# Patient Record
Sex: MALE | Race: WHITE | Employment: FULL TIME | URBAN - METROPOLITAN AREA
[De-identification: names, ages, dates, MRNs, and addresses within clinical notes are randomized per-mention and may not be internally consistent; named-entity substitution may affect disease eponyms.]

---

## 2018-12-28 ENCOUNTER — TRANSFERRED RECORDS (OUTPATIENT)
Dept: HEALTH INFORMATION MANAGEMENT | Facility: CLINIC | Age: 68
End: 2018-12-28

## 2019-01-08 ENCOUNTER — HOSPITAL ENCOUNTER (EMERGENCY)
Facility: CLINIC | Age: 69
Discharge: ANOTHER HOSPITAL | End: 2019-01-09
Attending: EMERGENCY MEDICINE | Admitting: EMERGENCY MEDICINE
Payer: MEDICAID

## 2019-01-08 DIAGNOSIS — R04.2 HEMOPTYSIS: ICD-10-CM

## 2019-01-08 PROCEDURE — 96361 HYDRATE IV INFUSION ADD-ON: CPT

## 2019-01-08 PROCEDURE — 51702 INSERT TEMP BLADDER CATH: CPT

## 2019-01-08 PROCEDURE — 96375 TX/PRO/DX INJ NEW DRUG ADDON: CPT

## 2019-01-08 PROCEDURE — 93005 ELECTROCARDIOGRAM TRACING: CPT | Mod: 59

## 2019-01-08 PROCEDURE — 93308 TTE F-UP OR LMTD: CPT

## 2019-01-08 PROCEDURE — 99285 EMERGENCY DEPT VISIT HI MDM: CPT | Mod: 25

## 2019-01-08 PROCEDURE — 31500 INSERT EMERGENCY AIRWAY: CPT

## 2019-01-08 PROCEDURE — 96365 THER/PROPH/DIAG IV INF INIT: CPT

## 2019-01-09 ENCOUNTER — APPOINTMENT (OUTPATIENT)
Dept: GENERAL RADIOLOGY | Facility: CLINIC | Age: 69
End: 2019-01-09
Payer: MEDICAID

## 2019-01-09 VITALS
DIASTOLIC BLOOD PRESSURE: 98 MMHG | OXYGEN SATURATION: 96 % | TEMPERATURE: 97.1 F | HEART RATE: 128 BPM | WEIGHT: 165 LBS | RESPIRATION RATE: 21 BRPM | SYSTOLIC BLOOD PRESSURE: 143 MMHG

## 2019-01-09 LAB
ABO + RH BLD: NORMAL
ABO + RH BLD: NORMAL
ALBUMIN SERPL-MCNC: 4.1 G/DL (ref 3.4–5)
ALP SERPL-CCNC: 93 U/L (ref 40–150)
ALT SERPL W P-5'-P-CCNC: 41 U/L (ref 0–70)
ANION GAP SERPL CALCULATED.3IONS-SCNC: 11 MMOL/L (ref 6–17)
ANION GAP SERPL CALCULATED.3IONS-SCNC: 7 MMOL/L (ref 3–14)
AST SERPL W P-5'-P-CCNC: 28 U/L (ref 0–45)
BASOPHILS # BLD AUTO: 0 10E9/L (ref 0–0.2)
BASOPHILS NFR BLD AUTO: 0 %
BILIRUB SERPL-MCNC: 0.4 MG/DL (ref 0.2–1.3)
BLD GP AB SCN SERPL QL: NORMAL
BLOOD BANK CMNT PATIENT-IMP: NORMAL
BUN SERPL-MCNC: 17 MG/DL (ref 7–30)
BUN SERPL-MCNC: 17 MG/DL (ref 7–30)
CA-I BLD-SCNC: 4 MG/DL (ref 4.4–5.2)
CALCIUM SERPL-MCNC: 8.8 MG/DL (ref 8.5–10.1)
CHLORIDE BLD-SCNC: 105 MMOL/L (ref 94–109)
CHLORIDE SERPL-SCNC: 104 MMOL/L (ref 94–109)
CO2 BLD-SCNC: 25 MMOL/L (ref 20–32)
CO2 SERPL-SCNC: 29 MMOL/L (ref 20–32)
CREAT BLD-MCNC: 0.9 MG/DL (ref 0.66–1.25)
CREAT SERPL-MCNC: 0.98 MG/DL (ref 0.66–1.25)
DIFFERENTIAL METHOD BLD: ABNORMAL
EOSINOPHIL # BLD AUTO: 0.1 10E9/L (ref 0–0.7)
EOSINOPHIL NFR BLD AUTO: 1 %
ERYTHROCYTE [DISTWIDTH] IN BLOOD BY AUTOMATED COUNT: 12.8 % (ref 10–15)
GFR SERPL CREATININE-BSD FRML MDRD: 79 ML/MIN/{1.73_M2}
GFR SERPL CREATININE-BSD FRML MDRD: 84 ML/MIN/{1.73_M2}
GLUCOSE BLD-MCNC: 159 MG/DL (ref 70–99)
GLUCOSE SERPL-MCNC: 138 MG/DL (ref 70–99)
HCT VFR BLD AUTO: 46.1 % (ref 40–53)
HCT VFR BLD CALC: 38 %PCV (ref 40–53)
HGB BLD CALC-MCNC: 12.9 G/DL (ref 13.3–17.7)
HGB BLD-MCNC: 15.4 G/DL (ref 13.3–17.7)
INR PPP: 0.92 (ref 0.86–1.14)
INTERPRETATION ECG - MUSE: NORMAL
LYMPHOCYTES # BLD AUTO: 7.3 10E9/L (ref 0.8–5.3)
LYMPHOCYTES NFR BLD AUTO: 56 %
MCH RBC QN AUTO: 29.6 PG (ref 26.5–33)
MCHC RBC AUTO-ENTMCNC: 33.4 G/DL (ref 31.5–36.5)
MCV RBC AUTO: 89 FL (ref 78–100)
MONOCYTES # BLD AUTO: 0.7 10E9/L (ref 0–1.3)
MONOCYTES NFR BLD AUTO: 5 %
NEUTROPHILS # BLD AUTO: 4.9 10E9/L (ref 1.6–8.3)
NEUTROPHILS NFR BLD AUTO: 38 %
NT-PROBNP SERPL-MCNC: 18 PG/ML (ref 0–900)
PLATELET # BLD AUTO: 321 10E9/L (ref 150–450)
PLATELET # BLD EST: ABNORMAL 10*3/UL
POTASSIUM BLD-SCNC: 3.6 MMOL/L (ref 3.4–5.3)
POTASSIUM SERPL-SCNC: 3.8 MMOL/L (ref 3.4–5.3)
PROT SERPL-MCNC: 8.1 G/DL (ref 6.8–8.8)
RBC # BLD AUTO: 5.21 10E12/L (ref 4.4–5.9)
RBC MORPH BLD: ABNORMAL
SODIUM BLD-SCNC: 141 MMOL/L (ref 133–144)
SODIUM SERPL-SCNC: 140 MMOL/L (ref 133–144)
SPECIMEN EXP DATE BLD: NORMAL
TROPONIN I SERPL-MCNC: <0.015 UG/L (ref 0–0.04)
WBC # BLD AUTO: 13 10E9/L (ref 4–11)

## 2019-01-09 PROCEDURE — 40000986 XR CHEST PORT 1 VW

## 2019-01-09 PROCEDURE — 86850 RBC ANTIBODY SCREEN: CPT | Performed by: EMERGENCY MEDICINE

## 2019-01-09 PROCEDURE — 84484 ASSAY OF TROPONIN QUANT: CPT | Performed by: EMERGENCY MEDICINE

## 2019-01-09 PROCEDURE — C9113 INJ PANTOPRAZOLE SODIUM, VIA: HCPCS | Performed by: EMERGENCY MEDICINE

## 2019-01-09 PROCEDURE — 71045 X-RAY EXAM CHEST 1 VIEW: CPT

## 2019-01-09 PROCEDURE — 25000128 H RX IP 250 OP 636: Performed by: EMERGENCY MEDICINE

## 2019-01-09 PROCEDURE — 86901 BLOOD TYPING SEROLOGIC RH(D): CPT | Performed by: EMERGENCY MEDICINE

## 2019-01-09 PROCEDURE — 25000125 ZZHC RX 250

## 2019-01-09 PROCEDURE — 85025 COMPLETE CBC W/AUTO DIFF WBC: CPT | Performed by: EMERGENCY MEDICINE

## 2019-01-09 PROCEDURE — 25000128 H RX IP 250 OP 636

## 2019-01-09 PROCEDURE — 40000275 ZZH STATISTIC RCP TIME EA 10 MIN

## 2019-01-09 PROCEDURE — 96365 THER/PROPH/DIAG IV INF INIT: CPT

## 2019-01-09 PROCEDURE — 85014 HEMATOCRIT: CPT

## 2019-01-09 PROCEDURE — 25000125 ZZHC RX 250: Performed by: EMERGENCY MEDICINE

## 2019-01-09 PROCEDURE — 40000276 ZZH STATISTIC RCP TIME ED VENT EA 10 MIN

## 2019-01-09 PROCEDURE — 94660 CPAP INITIATION&MGMT: CPT

## 2019-01-09 PROCEDURE — 96375 TX/PRO/DX INJ NEW DRUG ADDON: CPT

## 2019-01-09 PROCEDURE — 96361 HYDRATE IV INFUSION ADD-ON: CPT

## 2019-01-09 PROCEDURE — 86900 BLOOD TYPING SEROLOGIC ABO: CPT | Performed by: EMERGENCY MEDICINE

## 2019-01-09 PROCEDURE — 85610 PROTHROMBIN TIME: CPT | Performed by: EMERGENCY MEDICINE

## 2019-01-09 PROCEDURE — 80053 COMPREHEN METABOLIC PANEL: CPT | Performed by: EMERGENCY MEDICINE

## 2019-01-09 PROCEDURE — 83880 ASSAY OF NATRIURETIC PEPTIDE: CPT | Performed by: EMERGENCY MEDICINE

## 2019-01-09 PROCEDURE — 80047 BASIC METABLC PNL IONIZED CA: CPT

## 2019-01-09 RX ORDER — SODIUM CHLORIDE, SODIUM LACTATE, POTASSIUM CHLORIDE, CALCIUM CHLORIDE 600; 310; 30; 20 MG/100ML; MG/100ML; MG/100ML; MG/100ML
1000 INJECTION, SOLUTION INTRAVENOUS CONTINUOUS
Status: DISCONTINUED | OUTPATIENT
Start: 2019-01-09 | End: 2019-01-09 | Stop reason: HOSPADM

## 2019-01-09 RX ORDER — SILDENAFIL 50 MG/1
50 TABLET, FILM COATED ORAL DAILY PRN
COMMUNITY

## 2019-01-09 RX ORDER — PROPOFOL 10 MG/ML
INJECTION, EMULSION INTRAVENOUS
Status: DISCONTINUED
Start: 2019-01-09 | End: 2019-01-09 | Stop reason: HOSPADM

## 2019-01-09 RX ORDER — ONDANSETRON 2 MG/ML
4 INJECTION INTRAMUSCULAR; INTRAVENOUS ONCE
Status: COMPLETED | OUTPATIENT
Start: 2019-01-09 | End: 2019-01-09

## 2019-01-09 RX ORDER — SODIUM CHLORIDE 9 MG/ML
1000 INJECTION, SOLUTION INTRAVENOUS CONTINUOUS
Status: DISCONTINUED | OUTPATIENT
Start: 2019-01-09 | End: 2019-01-09 | Stop reason: HOSPADM

## 2019-01-09 RX ORDER — SIMVASTATIN 20 MG
20 TABLET ORAL AT BEDTIME
COMMUNITY

## 2019-01-09 RX ORDER — PROPOFOL 10 MG/ML
20 INJECTION, EMULSION INTRAVENOUS ONCE
Status: COMPLETED | OUTPATIENT
Start: 2019-01-09 | End: 2019-01-09

## 2019-01-09 RX ADMIN — SODIUM CHLORIDE, POTASSIUM CHLORIDE, SODIUM LACTATE AND CALCIUM CHLORIDE 1000 ML: 600; 310; 30; 20 INJECTION, SOLUTION INTRAVENOUS at 00:44

## 2019-01-09 RX ADMIN — SODIUM CHLORIDE, POTASSIUM CHLORIDE, SODIUM LACTATE AND CALCIUM CHLORIDE 1000 ML: 600; 310; 30; 20 INJECTION, SOLUTION INTRAVENOUS at 00:21

## 2019-01-09 RX ADMIN — SODIUM CHLORIDE 1000 ML: 9 INJECTION, SOLUTION INTRAVENOUS at 00:08

## 2019-01-09 RX ADMIN — ONDANSETRON 4 MG: 2 INJECTION INTRAMUSCULAR; INTRAVENOUS at 00:08

## 2019-01-09 RX ADMIN — SODIUM CHLORIDE 1000 ML: 0.9 INJECTION, SOLUTION INTRAVENOUS at 00:20

## 2019-01-09 RX ADMIN — PANTOPRAZOLE SODIUM 40 MG: 40 INJECTION, POWDER, FOR SOLUTION INTRAVENOUS at 00:10

## 2019-01-09 RX ADMIN — PROPOFOL 1496 MCG/MIN: 10 INJECTION, EMULSION INTRAVENOUS at 01:42

## 2019-01-09 RX ADMIN — TAZOBACTAM SODIUM AND PIPERACILLIN SODIUM 3.38 G: 375; 3 INJECTION, SOLUTION INTRAVENOUS at 01:52

## 2019-01-09 ASSESSMENT — ENCOUNTER SYMPTOMS
COUGH: 1
NAUSEA: 0
BLOOD IN STOOL: 0

## 2019-01-09 NOTE — PROGRESS NOTES
RT Note:    Patient started on BiPAP 16/8 and 100%. Patient coughing up blood frequently, nasal cannula on under mask to prevent desat when pt comes of mask to cough.    Deanna Rizzo  January 9, 2019.12:58 AM

## 2019-01-09 NOTE — ED NOTES
Patient decompensating, vomiting more blood, increased respirations and work of breathing. BP increasing decision to intubate by Physician.

## 2019-01-09 NOTE — ED NOTES
Etomidate 20 mg push, 0136 Succ 140 mg push for intubation. ET tube 7.5, 24 @ lip, good color change, confirmed by auscultation, bilateral breath sounds. Reyes Placed 16 fr at 0144, clear urine output 800 ml drained. OG 65 at the lip 16 fr. Propofol 50 mg bolus at 0145.

## 2019-01-09 NOTE — ED NOTES
"RT at bedside, pt with BIPAP on blow-by due to reporting \"I can't breathe\" while face mask is applied. O2 98%  "

## 2019-01-09 NOTE — ED PROVIDER NOTES
History     Chief Complaint:  Hemoptysis    HPI   Cuba Paniagua is a 68 year old male who presents to the emergency department today for evaluation of hemoptysis. Patient recently had a bronchoscopy scope on 12/28/18 at [facility] because there appeared to be an infiltrate or mass on an image, results below. During the procedure, family states that it rubbed too much against his throat and started to bleed and they were not able to finish the procedure. The patient is rescheduled for his scope on 01/17/19 to make sure there is no cancer. After the procedure, the patient was sent home with Guaifenesin-codeine and his last dose was at 9 PM tonight and then he started coughing. When they presented to the ED, the patient was coughing up blood, stating that he was fine before he went to bed tonight. Patient denies any pain, blood in stools, or nausea.    Paper notes from [facility] Dr [facility]:  Bronchial Tree: there is a large mass appears submucosal causing internal compression of DICK takeoff.    Procedure 12/28/18:  The left upper lobe either has an infiltrate or mass. It is unclear if the opening to that love is open or not. This may be something that has been that way for years or may be newer. Plan for bronchoscopy to look into lungs. This procedure does not hurt and is very safe. If there is something there I can try to biopsy. If that is normal we may do other scans.    Allergies:  No Known Drug Allergies    Medications:    Simvastatin- not taking  Viagra- - not taking  Guaifenesin-codeine     Past Medical History:    High cholesterol  ED    Past Surgical History:    Surgical history reviewed. No pertinent surgical history.    Family History:    Family history reviewed. No pertinent family history.    Social History:  The patient was accompanied to the ED by family.  Marital Status:       Review of Systems   Respiratory: Positive for cough (hemoptysis).    Gastrointestinal: Negative for blood in stool and nausea.      Coughing up blood.    Physical Exam     Patient Vitals for the past 24 hrs:   BP Temp Temp src Pulse Heart Rate Resp SpO2 Weight   01/09/19 0150 (!) 143/98 -- -- 128 124 21 96 % --   01/09/19 0149 -- -- -- -- 124 30 99 % --   01/09/19 0148 -- -- -- -- -- -- 94 % --   01/09/19 0146 -- -- -- -- 135 (!) 32 97 % --   01/09/19 0115 -- -- -- -- 111 22 95 % --   01/09/19 0040 -- -- -- -- -- 28 96 % --   01/09/19 0037 -- -- -- 121 -- -- -- --   01/09/19 0020 (!) 154/102 -- -- -- -- -- 90 % --   01/08/19 2355 (!) 173/111 97.1  F (36.2  C) Temporal 131 131 (!) 32 (!) 84 % 74.8 kg (165 lb)       Physical Exam  GEN: patient coughing up blood, spitting every 4-5 minutes with at least 10cc of blood every episode of cough.  No vomiting  HEAD: atraumatic, normocephalic  EYES: pupils reactive, conjunctivae normal  ENT: TMs flat and white bilaterally, oropharynx normal with no erythema or exudate, mucus membranes dry, mixed blood in oropharynx  NECK: no JVD, no carotid bruits  RESPIRATORY: tachypnea, breath sounds decreased to auscultation, rales upon inspiration, coarse breath sounds with multiple episodes of coughing and   CVS: normal S1/S2, I/VI systolic ej murmurs, no rubs/gallops, tachycardia  ABDOMEN: soft, nontender, no masses or organomegaly, no rebound, decreased bowel sounds  BACK: no lesions  EXTREMITIES: intact pulses x 2 (radial pulses intact). no edema  MUSCULOSKELETAL: no deformities  SKIN: warm and dry, no acute rashes  NEURO: GCS 14 (Latvian speaking family at the bedside) cranial nerves intact.  Motor- moves all 4 extremities  Sensation- intact  Coordination- too ill to ambulate Overall symmetrical exam  HEME: no bruising or petechiae/contusions  LYMPH: no lymphadenopathy      Emergency Department Course     ECG:  ECG taken at [facility]  Sinus tachycardia  Marked ST abnormality,, movement artifact.  ?ST depression 0.5mm inferiorolaterally  Abnormal ECG  Rate 134 bpm. IA interval 138 ms. QRS duration 96 ms. QT/QTc  394/588 ms. P-R-T axes 65 -6 52.    Imaging:  Radiology findings were communicated with the patient who voiced understanding of the findings.    Chest  XR, 1 view PORTABLE  1. Interval placement of an endotracheal tube with distal tip in the  trachea.  2. Interval placement of an enteric drainage tube with distal tip not  visualized, but at least to the stomach.  3. A small region of hazy opacities in the right lung base that could  represent pneumonia or atelectasis.  NORMA WALLS MD  Reading per radiology    XR Chest Port 1 View  1. A small region of hazy opacities in the medial aspect of the right  lung base. This could relate to normal pulmonary vasculature, but  pneumonia is also possible.  2. No other findings suspicious for active cardiopulmonary disease.  NORMA WALLS MD  Reading per radiology    Laboratory:  Laboratory findings were communicated with the patient who voiced understanding of the findings.    Troponin (Collected 0009): <0.015    ABO/Rh type and screen: B pos  BNP: 18    CBC: WBC 13.0(H), HGB 15.4,   CMP (Collected: 0009): glucose 138(H) o/w WNL (Creatinine 0.98)    ISTAT Basic Met ICA hematocrit POCT (Collected 0022): glucose 159(H), calcium ionized 4.0(L), hemoglobin 12.9(L), hematocrit 38(L) o/w WNL    INR: 0.92    ED CARDIAC FOCUSED ULTRASOUND:     Indication: SOB, hypotension    Performed by: Dr. Reid    Body location: chest and subcostal abdomen    Probe used: cardiac phased array probe    Focused cardiac exam findings:    Location- parasternal long axis    EF estimation:   Normal (EF > 55%), tachycardic      Cardiac contractility: present    Pericardial effusion: absent    IVC compliance:   > or equal to 50% inspiratory collapse      Impression: Grossly normal kinesis, no pericardial effusion, absence of asystole    Archiving: The quality of the exam was good.  The images were NOT able to be printed and attached for inclusion in the patients medical record.  "    Procedures:  [facility] Procedure Note          Intubation:     Date/Time: 1:25 AM  Performed by: Rula Reid MD    Consent was obtained after discussing the risks, benefits and alternatives with the Patient and Family.  Risks and benefits: risks, benefits and alternatives were discussed.  Patient identity confirmed: verbally with patient and arm band  Time out: Immediately prior to procedure a \"time out\" was called to verify the correct patient, procedure, equipment, support staff and site/side marked as required.    Indication: Airway protection, hypoxia    Intubation method: direct  Patient status: RSI  Preoxygenation: Mask  Pretreatment medications:none  Sedatives: etomidate 20mg IV  Paralytic: 140mg succinylcholine IV  Laryngoscope size: mac 4  Tube size: 7.5, cuffed with cuff inflated after placement  Number of attempts: 1  Cricoid pressure: yes  Cords visualized: yes  Post-procedure assessment: Breath sounds equal bilaterally with chest rise and absent over the epigastrium, Chest x-ray interpreted by me demonstrating endotracheal tube in appropriate position, positive CO2 detetctor  ETT to teeth: 23 cm  Tube secured with: ETT castellon    Patient tolerated the procedure well with no immediate complications.  Complications:  None    Interventions:  0008 NS 1000ml IV  0008 Zofran 4mg IV  0010 Protonix 40mg IV  0021 lactated ringers 1000ml IV  0044 lactated ringers 1000ml IV  0142 propofol 1496 mcg/min  0152 Zosyn 3.375g IV  Heplock  Cardiac/Sp02 monitoring  Oxygen by nasal cannula at 2L/min  BiPAP 10/5 --> orotracheal intubation    Medications for intubation- etomidate and succinylcholine    Intubation  OG tube placed    Propofol 50mcg/kg/min gtt    Emergency Department Course:    2356 Nursing notes and vitals reviewed.    0000 I performed an exam of the patient as documented above.     0017 Moved to room 3, awaiting portable chest XR    0019 Portable chest XR performed, bedside ECHO    0030 Recheck " and updated family.    0041 I spoke with Dr. Riggs of the pulmonology service from [facility] regarding patient's presentation, findings, and plan of care. There are no beds, they are unable to accept.    0055 No bed available at [facility].    No beds at [facility] or [facility].    0106 I spoke with Dr. Michel of the ED service from [facility] regarding patient's presentation, findings, and plan of care. They have agreed to accept the patient to an    0108 Recheck and update. I took the patient off of BiPAP.     0125 I performed the intubation procedure as documented above.    0141 The patient had portable chest XR while in the emergency department, results above.     0150 EMS is here to transfer patient.    0221 I personally reviewed the lab and image results with the patient and family and answered all related questions prior to transfer.    BP (!) 143/98   Pulse 128   Temp 97.1  F (36.2  C) (Temporal)   Resp 21   Wt 74.8 kg (165 lb)   SpO2 96%       Impression & Plan      Medical Decision Making:  Cuba Paniagua is a 68 year old male who had an attempted bronchoscopy on 12/28 at [facility] for a new left upper lung mass. The patient states that tonight he started coughing and he has had multiple episodes of hemoptysis and now feels as though he cannot breathe. His initial history is assisted by his family who is at the bedside. When he first arrived he was tachycardic to the 140's and hypotensive, but he was in moderate respiratory distress. To peripheral IV's were and labs were sent. Portable chest XR was called for and he had a type and screen. We were attempting to get his records form [facility].  Some of the papers that his family brought in notes the procedures that were done from the pulmonologist. His white cell count came back as 13 and his hemoglobin came at normal at 15. The patient SATs were initially about 88% on room air. He was given oxygen by nasal canula and BiPAP was ordered and he was  moved to a critical care room. A bedside ECHO did not show tachycardia but no evidence of pericardial effusion, and he had a normal estimated ejection fraction. When the patient was moved to room 3 and he was on the BiPAP with the high flow nasal canula underneath, he was improved as far as his decreased work of breathing. However, as the patient has still been in the ED, he continues to cough up at least 500cc of blood. With the coughing spells, he would desaturate rapidly. We did call [facility], in addition to [facility], and [facility]. There were no ICU beds or a pulmonologist available.   Troponin and rest of labs started to come back normal.  Patient continued to have hemoptysis here in the ED, making BiPAP difficult.  Patient was given IV PPI.    Eventually, we did call [facility] and were able to get him transferred and discussed the case with Dr. Michel. Patient still had some mild respiratory distress, although improved from when he arrived with high flow nasal cannula and BiPAP. I did think for airway protection and for probable interventional radiology procedures at [place], that we should protect his airway. His family is at the bedside and we made the decision to oral tracheal intubate him. This was completed without any difficulty. Afterwards the patient had large amounts of copious blood that had to be suctioned (orotrachea, not from OG tube). He was given succinylcholine in addition to etomidate and we had to give him several doses of etomidate and propofol for agitation. EMS arrived at 0150 and at the time of discharge, he was not hypotensive, his pulse was about 115, he was markedly improved. A silveira catheter and OG tube was placed. He was given zosyn for possible atelectasis and possible pneumonia. The post intubation chest XR shows the distal tip in the trachea, small hazy opacities in the right lung base and there is a left lung mass. Patient will be transferred to  [facility] and he was hemodynamically stable at the time of discharge.    Total amount of hemoptysis in the ED: about 250-500cc    Diagnosis:  1.Massive hemoptysis  2. Respiratory distress  3. Left lung mass    Disposition:   The patient is transferred to [facility] for further evaluation and treatment under the care of Dr. Michel.      Critical Care time was 45 minutes for this patient excluding procedures.     Scribe Disclosure:  I, Iqra Barnhart, am serving as a scribe at 11:56 PM on 1/8/2019 to document services personally performed by Rula Reid MD based on my observations and the provider's statements to me.      [facility] EMERGENCY DEPARTMENT       Rula Reid MD  01/09/19 0657

## 2019-01-09 NOTE — PROGRESS NOTES
RT- Patient intubated at 0140 with a 7.5 ETT secured 24cm at the lip. Placement confirmed with bilateral breath sounds, equal chest rise, and  positive color change on ETCO2 detector. A chest xray was obtained. Patient was placed on a mechanical ventilator with the following settings:    Ventilation Mode: CMV/AC  (Continuous Mandatory Ventilation/ Assist Control)  FiO2 (%): 100 %  Rate Set (breaths/minute): 16 breaths/min  Tidal Volume Set (mL): 450 mL  PEEP (cm H2O): 5 cmH2O  Oxygen Concentration (%): 100 %  Resp: 21    Temp: 97.1  F (36.2  C) Temp src: Temporal BP: (!) 143/98 Pulse: 128 Heart Rate: 124 Resp: 21 SpO2: 96 % O2 Device: Mechanical Ventilator Oxygen Delivery: 6 LPM    Will continue to monitor and support.    Benito Millan, RT on 1/9/2019 at 2:03 AM

## 2019-01-09 NOTE — ED TRIAGE NOTES
Pt and family report pt began coughing up blood at 9 pm. Family states it was not a lot of blood at first. Pt is now coughing frequently and spitting out blood. Pt denies any pain. Pt had a recent bronchoscopy for a possible lung mass and the procedure had to be stopped due to bleeding.

## 2019-03-20 ENCOUNTER — OFFICE VISIT (OUTPATIENT)
Dept: UROLOGY | Facility: CLINIC | Age: 69
End: 2019-03-20
Payer: MEDICAID

## 2019-03-20 VITALS — OXYGEN SATURATION: 97 % | WEIGHT: 165 LBS | HEART RATE: 68 BPM

## 2019-03-20 DIAGNOSIS — N39.43 BENIGN PROSTATIC HYPERPLASIA WITH POST-VOID DRIBBLING: Primary | ICD-10-CM

## 2019-03-20 DIAGNOSIS — N40.1 BENIGN PROSTATIC HYPERPLASIA WITH POST-VOID DRIBBLING: Primary | ICD-10-CM

## 2019-03-20 LAB
ALBUMIN UR-MCNC: 100 MG/DL
APPEARANCE UR: CLEAR
BILIRUB UR QL STRIP: NEGATIVE
COLOR UR AUTO: YELLOW
GLUCOSE UR STRIP-MCNC: NEGATIVE MG/DL
HGB UR QL STRIP: NEGATIVE
KETONES UR STRIP-MCNC: NEGATIVE MG/DL
LEUKOCYTE ESTERASE UR QL STRIP: NEGATIVE
NITRATE UR QL: NEGATIVE
PH UR STRIP: 6 PH (ref 5–7)
RESIDUAL VOLUME (RV) (EXTERNAL): 61
SOURCE: ABNORMAL
SP GR UR STRIP: >1.03 (ref 1–1.03)
UROBILINOGEN UR STRIP-ACNC: 0.2 EU/DL (ref 0.2–1)

## 2019-03-20 PROCEDURE — 51798 US URINE CAPACITY MEASURE: CPT | Performed by: PHYSICIAN ASSISTANT

## 2019-03-20 PROCEDURE — 99204 OFFICE O/P NEW MOD 45 MIN: CPT | Mod: 25 | Performed by: PHYSICIAN ASSISTANT

## 2019-03-20 PROCEDURE — 81003 URINALYSIS AUTO W/O SCOPE: CPT | Mod: QW | Performed by: PHYSICIAN ASSISTANT

## 2019-03-20 RX ORDER — TAMSULOSIN HYDROCHLORIDE 0.4 MG/1
0.4 CAPSULE ORAL DAILY
Qty: 90 CAPSULE | Refills: 3 | Status: SHIPPED | OUTPATIENT
Start: 2019-03-20

## 2019-03-20 RX ORDER — DOXYCYCLINE 100 MG/1
100 CAPSULE ORAL 2 TIMES DAILY
Qty: 84 CAPSULE | Refills: 1 | Status: SHIPPED | OUTPATIENT
Start: 2019-03-20

## 2019-03-20 ASSESSMENT — PAIN SCALES - GENERAL: PAINLEVEL: NO PAIN (0)

## 2019-03-20 NOTE — LETTER
3/20/2019       RE: Cuba Paniagua   [address]  [place] [address]     Dear Colleague,    Thank you for referring your patient, Cuba Paniagua, to the [facility] UROLOGY CLINIC [place] at [facility]. Please see a copy of my visit note below.    2019      CC: Straining to urinate    HPI:  Cuba Paniagua is a pleasant 68 year old male who presents for evaluation of the above. peyronne     Father  of prostate cancer  But unsure of details.     Curvature with urination at times.  No erectile function for years.  Straining to void. Had a cath while hospitalized at [place]. Admitted in  for hemoptysis (Massive hemoptysis that likely related to endobronchial biopsy on  given temporal relationship and report of bronch needing to be stopped due to bleeding. CT on arrival to [facility] with left upper lobe mass at medial mediastinal border and occlusion of left mainstem bronchus. Suspicion for bronchial stenosis related to prior tuberculosis and left main occlusion from clot as visualized on bronchoscopy 1/10). Also treated for aspiration PNA. Had been intubated.      All of this started shortly after that, but has had very weak stream, post-void dribbling, nocturia for 3 months.    Has not had a PSA for >10 years.      did not show for his visit and a phone  was used in an office. Unable to examine in the available room with phone. Pt did not want to reschedule.        PMH: hyperlipids, anxiety, latent TB    SOC: nonsmoker    FH: father  of prostate cancer    ROS:14 point ROS neg other than the symptoms noted above in the HPI.    No Known Allergies    Current Outpatient Medications   Medication     sildenafil (VIAGRA) 50 MG tablet     simvastatin (ZOCOR) 20 MG tablet     No current facility-administered medications for this visit.          PEx:   Pulse 68   Wt 74.8 kg (165 lb)   SpO2 97%   Gen  appearance:  Well groomed,: age-appropriate appearing male in NAD.   HEENT:  EOMI, AT NC, CN grossly normal  Psych:  alert , comfortable in no acute distress  Neuro:  A/O X 3  Skin:  Warm to touch, clear of rashes, ecchymoses  Resp:  No increased respiratory effort  lymph:  No LE edema  : Unable--in the shared office space    Urine: neg  PVR 60cc    A/P: Cuba Paniagua is a 68 year old male with BPH with LUTS, poss prostatitis and peyronie's, hx of ED.     Flomax, SE reviewed  Doxy x 6 wks  Will need PSA once symptoms improve and JAQUELINE  Will address curvature and ED once these acute issues are improved.   He is to call me if there worsening of symptoms or unable to void or go to ER.    Priscilla Coyne PA-C  [facility]    30 minutes were spent with the patient today, > 50% in counseling and coordination of care

## 2019-03-20 NOTE — NURSING NOTE
No  was in the office today.  The phone  was used.  No exam today.  Not all intake questions done due to phone  time.  BRIANNA Stevenson CMA

## 2019-03-20 NOTE — NURSING NOTE
After in hospital for 6 days  And pt had cath.  And after cath out pt has pain now.  Vein under penis gets hard and hurts.  Pt states he has dysuria.  Pt has slow output and takes a lot of time.  After voiding pt leaks.  Pt states he no longer has brandan Stevenson CMA

## 2019-03-20 NOTE — PROGRESS NOTES
2019      CC: Straining to urinate    HPI:  Cuba Paniagua is a pleasant 68 year old male who presents for evaluation of the above. peyronne     Father  of prostate cancer  But unsure of details.     Curvature with urination at times.  No erectile function for years.  Straining to void. Had a cath while hospitalized at [place]. Admitted in  for hemoptysis (Massive hemoptysis that likely related to endobronchial biopsy on  given temporal relationship and report of bronch needing to be stopped due to bleeding. CT on arrival to [facility] with left upper lobe mass at medial mediastinal border and occlusion of left mainstem bronchus. Suspicion for bronchial stenosis related to prior tuberculosis and left main occlusion from clot as visualized on bronchoscopy 1/10). Also treated for aspiration PNA. Had been intubated.      All of this started shortly after that, but has had very weak stream, post-void dribbling, nocturia for 3 months.    Has not had a PSA for >10 years.      did not show for his visit and a phone  was used in an office. Unable to examine in the available room with phone. Pt did not want to reschedule.        PMH: hyperlipids, anxiety, latent TB    SOC: nonsmoker    FH: father  of prostate cancer    ROS:14 point ROS neg other than the symptoms noted above in the HPI.    No Known Allergies    Current Outpatient Medications   Medication     sildenafil (VIAGRA) 50 MG tablet     simvastatin (ZOCOR) 20 MG tablet     No current facility-administered medications for this visit.          PEx:   Pulse 68   Wt 74.8 kg (165 lb)   SpO2 97%   Gen appearance:  Well groomed,: age-appropriate appearing male in NAD.   HEENT:  EOMI, AT NC, CN grossly normal  Psych:  alert , comfortable in no acute distress  Neuro:  A/O X 3  Skin:  Warm to touch, clear of rashes, ecchymoses  Resp:  No increased respiratory effort  lymph:  No LE edema  : Unable--in the shared office  space    Urine: neg  PVR 60cc    A/P: Cuba Paniagua is a 68 year old male with BPH with LUTS, poss prostatitis and peyronie's, hx of ED.     Flomax, SE reviewed  Doxy x 6 wks  Will need PSA once symptoms improve and JAQUELINE  Will address curvature and ED once these acute issues are improved.   He is to call me if there worsening of symptoms or unable to void or go to ER.    Priscilla Coyne PA-C  [facility]    30 minutes were spent with the patient today, > 50% in counseling and coordination of care

## 2019-04-24 ENCOUNTER — OFFICE VISIT (OUTPATIENT)
Dept: UROLOGY | Facility: CLINIC | Age: 69
End: 2019-04-24

## 2019-04-24 VITALS — HEART RATE: 68 BPM | HEIGHT: 62 IN | BODY MASS INDEX: 30.36 KG/M2 | WEIGHT: 165 LBS | OXYGEN SATURATION: 98 %

## 2019-04-24 DIAGNOSIS — N40.1 BENIGN PROSTATIC HYPERPLASIA WITH POST-VOID DRIBBLING: Primary | ICD-10-CM

## 2019-04-24 DIAGNOSIS — N39.43 BENIGN PROSTATIC HYPERPLASIA WITH POST-VOID DRIBBLING: Primary | ICD-10-CM

## 2019-04-24 LAB
ALBUMIN UR-MCNC: NEGATIVE MG/DL
APPEARANCE UR: CLEAR
BILIRUB UR QL STRIP: NEGATIVE
COLOR UR AUTO: YELLOW
GLUCOSE UR STRIP-MCNC: NEGATIVE MG/DL
HGB UR QL STRIP: NEGATIVE
KETONES UR STRIP-MCNC: NEGATIVE MG/DL
LEUKOCYTE ESTERASE UR QL STRIP: NEGATIVE
NITRATE UR QL: NEGATIVE
PH UR STRIP: 5.5 PH (ref 5–7)
RESIDUAL VOLUME (RV) (EXTERNAL): 51
SOURCE: NORMAL
SP GR UR STRIP: >1.03 (ref 1–1.03)
UROBILINOGEN UR STRIP-ACNC: 0.2 EU/DL (ref 0.2–1)

## 2019-04-24 PROCEDURE — 99214 OFFICE O/P EST MOD 30 MIN: CPT | Performed by: PHYSICIAN ASSISTANT

## 2019-04-24 PROCEDURE — 81003 URINALYSIS AUTO W/O SCOPE: CPT | Mod: QW | Performed by: PHYSICIAN ASSISTANT

## 2019-04-24 ASSESSMENT — PAIN SCALES - GENERAL: PAINLEVEL: EXTREME PAIN (9)

## 2019-04-24 ASSESSMENT — MIFFLIN-ST. JEOR: SCORE: 1397.69

## 2019-04-24 NOTE — LETTER
RE: Cuba Paniagua   [address]  [place] [address]     Dear Colleague,    Thank you for referring your patient, Cuba Paniagua, to the [facility] [place] [facility]. Please see a copy of my visit note below.    CC: Straining to urinate    HPI:  Cuba Paniagua is a pleasant 68 year old male who presents for evaluation of the above.    Father  of prostate cancer  But unsure of details.     Has penile curvature with urination at times.  No erectile function for years.  Straining to void. Had a cath while hospitalized at [place]. Admitted in  for hemoptysis (Massive hemoptysis that likely related to endobronchial biopsy on  given temporal relationship and report of bronch needing to be stopped due to bleeding. CT [facility] with left upper lobe mass at medial mediastinal border and occlusion of left mainstem bronchus. Suspicion for bronchial stenosis related to prior tuberculosis and left main occlusion from clot as visualized on bronchoscopy 1/10). Also treated for aspiration PNA. Had been intubated.      All of this started shortly after that, but has had very weak stream, post-void dribbling, nocturia, straining for 3 months. This all continues after being on Flomax since our last visit. Had also been prescribed doxy, but unclear why he did not take this.      Has not had a PSA for >10 years.     The patient presents today with an  whom aids in obtaining the history.    PMH: hyperlipids, anxiety, latent TB    SOC: nonsmoker    FH: father  of prostate cancer    ROS:14 point ROS neg other than the symptoms noted above in the HPI.    No Known Allergies    Current Outpatient Medications   Medication     doxycycline monohydrate (MONODOX) 100 MG capsule     sildenafil (VIAGRA) 50 MG tablet     simvastatin (ZOCOR) 20 MG tablet     tamsulosin (FLOMAX) 0.4 MG capsule     No current  "facility-administered medications for this visit.          PEx:   Pulse 68   Ht 1.575 m (5' 2\")   Wt 74.8 kg (165 lb)   SpO2 98%   BMI 30.18 kg/m     Gen appearance:  Well groomed,: age-appropriate appearing male in NAD.   HEENT:  EOMI, AT NC, CN grossly normal  Psych:  alert , comfortable in no acute distress  Neuro:  A/O X 3  Skin:  Warm to touch, clear of rashes, ecchymoses  Resp:  No increased respiratory effort  lymph:  No LE edema      Urine: neg  PVR 60cc    A/P: Cuba Paniagua is a 68 year old male with BPH with LUTS, poss peyronie's, hx of ED.   -Cysto to eval for outlet obstruction  - PSA  -Continue Flomax  -May need ED/Peyronne's follow up with Dr. Clark at [facility]    30 minutes were spent with the patient today, > 50% in counseling and coordination of care    Again, thank you for allowing me to participate in the care of your patient.      Sincerely,    Priscilla Coyne PATRUDI, PA-C      "

## 2019-04-24 NOTE — NURSING NOTE
Pt here for 4 week follow-up.  pvr by scanner=51ml  Pt has some pain in his penis.  Pt states everything is the same as when he was here last time.  BRIANNA Stevenson CMA

## 2019-04-24 NOTE — PROGRESS NOTES
"CC: Straining to urinate    HPI:  Cuba Paniagua is a pleasant 68 year old male who presents for evaluation of the above.    Father  of prostate cancer  But unsure of details.     Has penile curvature with urination at times.  No erectile function for years.  Straining to void. Had a cath while hospitalized at [facility]. Admitted in  for hemoptysis (Massive hemoptysis that likely related to endobronchial biopsy on  given temporal relationship and report of bronch needing to be stopped due to bleeding. CT on arrival to [facility] with left upper lobe mass at medial mediastinal border and occlusion of left mainstem bronchus. Suspicion for bronchial stenosis related to prior tuberculosis and left main occlusion from clot as visualized on bronchoscopy 1/10). Also treated for aspiration PNA. Had been intubated.      All of this started shortly after that, but has had very weak stream, post-void dribbling, nocturia, straining for 3 months. This all continues after being on Flomax since our last visit. Had also been prescribed doxy, but unclear why he did not take this.      Has not had a PSA for >10 years.     The patient presents today with an  whom aids in obtaining the history.    PMH: hyperlipids, anxiety, latent TB    SOC: nonsmoker    FH: father  of prostate cancer    ROS:14 point ROS neg other than the symptoms noted above in the HPI.    No Known Allergies    Current Outpatient Medications   Medication     doxycycline monohydrate (MONODOX) 100 MG capsule     sildenafil (VIAGRA) 50 MG tablet     simvastatin (ZOCOR) 20 MG tablet     tamsulosin (FLOMAX) 0.4 MG capsule     No current facility-administered medications for this visit.          PEx:   Pulse 68   Ht 1.575 m (5' 2\")   Wt 74.8 kg (165 lb)   SpO2 98%   BMI 30.18 kg/m    Gen appearance:  Well groomed,: age-appropriate appearing male in NAD.   HEENT:  EOMI, AT NC, CN grossly normal  Psych:  alert , comfortable in no acute " distress  Neuro:  A/O X 3  Skin:  Warm to touch, clear of rashes, ecchymoses  Resp:  No increased respiratory effort  lymph:  No LE edema      Urine: neg  PVR 60cc    A/P: Cuba Paniagua is a 68 year old male with BPH with LUTS, poss peyronie's, hx of ED.   -Cysto to eval for outlet obstruction  - PSA  -Continue Flomax  -May need ED/Peyronne's follow up with Dr. Clark at [facility]    Priscilla Coyne PA-C  [facility]    30 minutes were spent with the patient today, > 50% in counseling and coordination of care

## 2019-06-03 DIAGNOSIS — N40.0 BPH (BENIGN PROSTATIC HYPERPLASIA): Primary | ICD-10-CM

## 2019-06-04 ENCOUNTER — OFFICE VISIT (OUTPATIENT)
Dept: UROLOGY | Facility: CLINIC | Age: 69
End: 2019-06-04

## 2019-06-04 VITALS
DIASTOLIC BLOOD PRESSURE: 82 MMHG | WEIGHT: 155 LBS | HEIGHT: 62 IN | SYSTOLIC BLOOD PRESSURE: 130 MMHG | HEART RATE: 83 BPM | BODY MASS INDEX: 28.52 KG/M2 | OXYGEN SATURATION: 95 %

## 2019-06-04 DIAGNOSIS — R39.198 SLOWING OF URINARY STREAM: ICD-10-CM

## 2019-06-04 DIAGNOSIS — N52.9 ERECTILE DYSFUNCTION, UNSPECIFIED ERECTILE DYSFUNCTION TYPE: ICD-10-CM

## 2019-06-04 DIAGNOSIS — N99.115 POSTPROCEDURAL FOSSA NAVICULARIS URETHRAL STRICTURE: Primary | ICD-10-CM

## 2019-06-04 DIAGNOSIS — R33.9 INCOMPLETE EMPTYING OF BLADDER: ICD-10-CM

## 2019-06-04 DIAGNOSIS — R39.14 BENIGN PROSTATIC HYPERPLASIA WITH INCOMPLETE BLADDER EMPTYING: ICD-10-CM

## 2019-06-04 DIAGNOSIS — N40.1 BENIGN PROSTATIC HYPERPLASIA WITH INCOMPLETE BLADDER EMPTYING: ICD-10-CM

## 2019-06-04 LAB
ALBUMIN UR-MCNC: NEGATIVE MG/DL
APPEARANCE UR: CLEAR
BILIRUB UR QL STRIP: NEGATIVE
COLOR UR AUTO: YELLOW
GLUCOSE UR STRIP-MCNC: NEGATIVE MG/DL
HGB UR QL STRIP: NEGATIVE
KETONES UR STRIP-MCNC: NEGATIVE MG/DL
LEUKOCYTE ESTERASE UR QL STRIP: NEGATIVE
NITRATE UR QL: NEGATIVE
PH UR STRIP: 5 PH (ref 5–7)
PSA SERPL-MCNC: 1.1 NG/ML (ref 0–4)
SOURCE: NORMAL
SP GR UR STRIP: >1.03 (ref 1–1.03)
UROBILINOGEN UR STRIP-ACNC: 0.2 EU/DL (ref 0.2–1)

## 2019-06-04 PROCEDURE — 84153 ASSAY OF PSA TOTAL: CPT | Performed by: UROLOGY

## 2019-06-04 PROCEDURE — 81003 URINALYSIS AUTO W/O SCOPE: CPT | Performed by: UROLOGY

## 2019-06-04 PROCEDURE — 52000 CYSTOURETHROSCOPY: CPT | Mod: 53 | Performed by: UROLOGY

## 2019-06-04 PROCEDURE — 36415 COLL VENOUS BLD VENIPUNCTURE: CPT | Performed by: UROLOGY

## 2019-06-04 RX ORDER — SILDENAFIL 100 MG/1
100 TABLET, FILM COATED ORAL PRN
Qty: 30 TABLET | Refills: 3 | Status: SHIPPED | OUTPATIENT
Start: 2019-06-04

## 2019-06-04 SDOH — HEALTH STABILITY: MENTAL HEALTH: HOW OFTEN DO YOU HAVE A DRINK CONTAINING ALCOHOL?: NEVER

## 2019-06-04 ASSESSMENT — MIFFLIN-ST. JEOR: SCORE: 1352.33

## 2019-06-04 ASSESSMENT — PAIN SCALES - GENERAL: PAINLEVEL: NO PAIN (0)

## 2019-06-04 NOTE — NURSING NOTE
Chief Complaint   Patient presents with     Cystoscopy     pt is here for a cystoscopy due to BPH       There is no problem list on file for this patient.      No Known Allergies      Pt has signed the consent form stating that we will be doing a CYSTOSCOPY (with or without stent removal) today, and that it is the correct procedure. I verbally confirmed the patient s identity using two indicators, relevant allergies, and that the correct equipment was available. Post-op information given to the pt as needed at check-out. A  appropriate antibiotic was sent  to the pharmacy in our building as recommended by the MD.     5mL 2% lidocaine hydrochloride Urojet instilled into urethra.    NDC# [address]-36  Lot #: [id number]   Expiration Date:      UA RESULTS:  Recent Labs   Lab Test 19  1549   COLOR Yellow   APPEARANCE Clear   URINEGLC Negative   URINEBILI Negative   URINEKETONE Negative   SG >1.030   UBLD Negative   URINEPH 5.0   PROTEIN Negative   UROBILINOGEN 0.2   NITRITE Negative   LEUKEST Negative             LEROY Pagan-B  2019

## 2019-06-04 NOTE — LETTER
6/4/2019       RE: Cuba Paniagua  [address]  [place] [address]     Dear Colleague,    Thank you for referring your patient, Cuba Paniagua, to the [facility]OLOGY [facility] [place] [facility]. Please see a copy of my visit note below.    PRE-PROCEDURE DIAGNOSIS: slowed stream, straining to void, incomplete emptying   POST-PROCEDURE DIAGNOSIS: fossa navicularis stricture  PROCEDURE: Cystoscopy  HISTORY: Cuba Paniagua is a 68 year old male with complex history of lung mass found to have lower urinary tract symptoms despite alpha blocker.   REVIEW OF OFFICE STUDIES (e.g., uroflow or PVR):   DESCRIPTION OF PROCEDURE: After informed consent was obtained, the patient was brought to the procedure room where he was placed in the supine position with all pressure points well padded.  The penis and scrotum were prepped and draped in a sterile fashion. A flexible cystoscope was introduced through a well-lubricated urethra. The cystoscope did not advance in the fossa navicularis. A stricture was noted and we were unable to visualize beyond the stricture.     ASSESSMENT AND PLAN: 68 year old male with fossa navicularis stricture   Will obtain RUG/VCUG   Refer to Dr. Cook for further evaluation and possible repair   Patient cautioned that he may have an element of obstruction from the prostate but his fossa navicularis stricture is his more immediate issue       Again, thank you for allowing me to participate in the care of your patient.      Sincerely,    Janelle Baptiste MD

## 2019-06-04 NOTE — LETTER
Date:June 7, 2019      Patient was self referred, no letter generated. Do not send.        [facility] Health Information

## 2019-06-05 ENCOUNTER — PRE VISIT (OUTPATIENT)
Dept: UROLOGY | Facility: CLINIC | Age: 69
End: 2019-06-05

## 2019-06-05 NOTE — TELEPHONE ENCOUNTER
MEDICAL RECORDS REQUEST   [facility]  Urology Clinic  [address] [place] [address]  PHONE: [phone]  Fax: [phone]        FUTURE VISIT INFORMATION                                                   Cuba Paniagua, : 1950 scheduled for future visit at [facility]    APPOINTMENT INFORMATION:    Date: 19 2PM     Provider:  Olvin Cook MD     Reason for Visit/Diagnosis: RUG at [facility] consult     REFERRAL INFORMATION:    Referring provider: Janelle Baptiste     Specialty: MD    Referring providers clinic:  [facility]     Clinic contact number:  [phone]    RECORDS REQUESTED FOR VISIT                                                     NOTES  STATUS/DETAILS   OFFICE NOTE from referring provider  yes   OFFICE NOTE from other specialist  yes   DISCHARGE SUMMARY from hospital  no   DISCHARGE REPORT from the ER  no   OPERATIVE REPORT  no   MEDICATION LIST  yes   URETHRAL STRICTURE     RUG (IMAGES & REPORT)  in process   VCUG  (IMAGES & REPORT)  no     PRE-VISIT CHECKLIST      Record collection complete Yes - All recs in Epic    Appointment appropriately scheduled           (right time/right provider) Yes   MyChart activation If no, please explain: In process   Questionnaire complete If no, please explain; In process      Completed by: Alicia Arreola

## 2019-06-06 NOTE — PROGRESS NOTES
PRE-PROCEDURE DIAGNOSIS: slowed stream, straining to void, incomplete emptying   POST-PROCEDURE DIAGNOSIS: fossa navicularis stricture  PROCEDURE: Cystoscopy  HISTORY: Cuba Paniagua is a 68 year old male with complex history of lung mass found to have lower urinary tract symptoms despite alpha blocker.   REVIEW OF OFFICE STUDIES (e.g., uroflow or PVR):   DESCRIPTION OF PROCEDURE: After informed consent was obtained, the patient was brought to the procedure room where he was placed in the supine position with all pressure points well padded.  The penis and scrotum were prepped and draped in a sterile fashion. A flexible cystoscope was introduced through a well-lubricated urethra. The cystoscope did not advance in the fossa navicularis. A stricture was noted and we were unable to visualize beyond the stricture.     ASSESSMENT AND PLAN: 68 year old male with fossa navicularis stricture   Will obtain RUG/VCUG   Refer to Dr. Cook for further evaluation and possible repair   Patient cautioned that he may have an element of obstruction from the prostate but his fossa navicularis stricture is his more immediate issue

## 2019-06-11 ENCOUNTER — DOCUMENTATION ONLY (OUTPATIENT)
Dept: CARE COORDINATION | Facility: CLINIC | Age: 69
End: 2019-06-11

## 2019-07-08 ENCOUNTER — PRE VISIT (OUTPATIENT)
Dept: UROLOGY | Facility: CLINIC | Age: 69
End: 2019-07-08

## 2019-07-08 NOTE — TELEPHONE ENCOUNTER
Chief Complaint : Return-From Dr. Baptiste     Hx/Sx: Urethral Stricture     Records/Orders: RUG/VCUG on 7/10    Pt Contacted: N/A    At Rooming: Normal

## 2019-07-09 ENCOUNTER — TELEPHONE (OUTPATIENT)
Dept: UROLOGY | Facility: CLINIC | Age: 69
End: 2019-07-09

## 2019-07-10 ENCOUNTER — OFFICE VISIT (OUTPATIENT)
Dept: UROLOGY | Facility: CLINIC | Age: 69
End: 2019-07-10
Payer: MEDICAID

## 2019-07-10 ENCOUNTER — ANCILLARY PROCEDURE (OUTPATIENT)
Dept: GENERAL RADIOLOGY | Facility: CLINIC | Age: 69
End: 2019-07-10
Attending: UROLOGY
Payer: MEDICAID

## 2019-07-10 VITALS
WEIGHT: 174.8 LBS | BODY MASS INDEX: 31.97 KG/M2 | DIASTOLIC BLOOD PRESSURE: 95 MMHG | SYSTOLIC BLOOD PRESSURE: 155 MMHG | HEART RATE: 94 BPM

## 2019-07-10 DIAGNOSIS — N99.115 POSTPROCEDURAL FOSSA NAVICULARIS URETHRAL STRICTURE: ICD-10-CM

## 2019-07-10 DIAGNOSIS — N35.819 OTHER STRICTURE OF URETHRA IN MALE: Primary | ICD-10-CM

## 2019-07-10 RX ORDER — LIDOCAINE HYDROCHLORIDE 20 MG/ML
10 JELLY TOPICAL ONCE
Status: COMPLETED | OUTPATIENT
Start: 2019-07-10 | End: 2019-07-10

## 2019-07-10 RX ADMIN — LIDOCAINE HYDROCHLORIDE 10 ML: 20 JELLY TOPICAL at 11:27

## 2019-07-10 ASSESSMENT — ENCOUNTER SYMPTOMS
HEMATURIA: 0
EYE PAIN: 0
DOUBLE VISION: 0
FLANK PAIN: 0
DYSURIA: 0
EYE WATERING: 0

## 2019-07-10 ASSESSMENT — PAIN SCALES - GENERAL: PAINLEVEL: NO PAIN (0)

## 2019-07-10 NOTE — LETTER
7/10/2019       RE: Cuba Paniagua  [address]  [place] [address]     Dear Colleague,    Thank you for referring your patient, Cuba Paniagua, to the [facility] UROLOGY AND INST FOR PROSTATE AND UROLOGIC CANCERS at [facility]. Please see a copy of my visit note below.    Urology Consult History and Physical    Name: Cuba Paniagua    MRN: [id number]   YOB: 1950         CHIEF COMPLAINT:  Urethral stricture.  HISTORY OF PRESENT ILLNESS:  Mr. Keegan Paniagua is a 68 year old-year-old man seen in consultation from Dr. Baptiste for fossa urethral stricture. Symptoms include slow stream and sense of incomplete emptying.   The penis seems to be curved when urinating.  He feels the urine is blocked. The stream is very thin.  He has not had prior treatments for his urethral stricture.   He does not have a history of self dilation. He does not perform dilation. He does not have a catheter in place.    Etiology:  He denies a history of sexually transmitted diseases. He denies a history of straddle injury. He denies a history of pelvic fracture. He confirms a history of urethral catheterization or instrumentation.  He had a complex lung mass. A biopsy in Dec 2018 was negative. He had old TB. After the biopsy, he suffered from severe hemoptysis and respiratory failure. He ended up coming back to the hospital for about a week. There was no cancer.   During that hospitalization,  He had a catheter for about one week. After that episode, he feels his urine stream changed.   He also has some ED. He's tried Viagra 100mg.    REVIEW OF QUESTIONNAIRES:  Questionnaires reviewed. See flowsheet for details.  PMH:  Vocal cord injury/polyps  Old TB  PSH:   Endobronchial biopsy    Current Outpatient Medications   Medication     doxycycline monohydrate (MONODOX) 100 MG capsule     ranitidine (ZANTAC) 150 MG tablet     sildenafil (VIAGRA) 100 MG tablet     tamsulosin  (FLOMAX) 0.4 MG capsule     sildenafil (VIAGRA) 50 MG tablet     simvastatin (ZOCOR) 20 MG tablet     No current facility-administered medications for this visit.      Allergies as of 07/10/2019     (No Known Allergies)     Fam HX  None relevant    Social History     Socioeconomic History     Marital status: Single     Spouse name: Not on file     Number of children: Not on file     Years of education: Not on file     Highest education level: Not on file   Occupational History     Not on file   Social Needs     Financial resource strain: Not on file     Food insecurity:     Worry: Not on file     Inability: Not on file     Transportation needs:     Medical: Not on file     Non-medical: Not on file   Tobacco Use     Smoking status: Never Smoker     Smokeless tobacco: Never Used   Substance and Sexual Activity     Alcohol use: Never     Frequency: Never     Drug use: Not on file     Sexual activity: Not on file   Lifestyle     Physical activity:     Days per week: Not on file     Minutes per session: Not on file     Stress: Not on file   Relationships     Social connections:     Talks on phone: Not on file     Gets together: Not on file     Attends Zoroastrian service: Not on file     Active member of club or organization: Not on file     Attends meetings of clubs or organizations: Not on file     Relationship status: Not on file     Intimate partner violence:     Fear of current or ex partner: Not on file     Emotionally abused: Not on file     Physically abused: Not on file     Forced sexual activity: Not on file   Other Topics Concern     Parent/sibling w/ CABG, MI or angioplasty before 65F 55M? Not Asked   Social History Narrative     Not on file     REVIEW OF SYSTEMS:    Skin: negative  Eyes: negative  Ears/Nose/Throat: negative  Respiratory: No shortness of breath, dyspnea on exertion, cough, or hemoptysis  Cardiovascular: negative  Gastrointestinal: negative  Genitourinary: as above  Musculoskeletal:  "negative  Neurologic: negative  Psychiatric: negative  Hematologic/Lymphatic/Immunologic: negative  Endocrine: negative  The remainder of the 14-point review of systems is negative.  PHYSICAL EXAMINATION:  General:  Well-dressed, well-nourished man in no acute distress.  Vitals: BP (!) 155/95   Pulse 94   Wt 79.3 kg (174 lb 12.8 oz)   BMI 31.97 kg/m    Estimated body mass index is 31.97 kg/m  as calculated from the following:    Height as of 6/4/19: 1.575 m (5' 2\").    Weight as of this encounter: 79.3 kg (174 lb 12.8 oz).    Eyes: anicteric, EOMI  Lymph: No cervical, supraclavicular or axillary lymphadenopathy  Lungs:  No respiratory distress.  Heart:  Regular rate and rhythm.     Abdomen:  not obese, soft, nontender, without masses.  There are not surgical scars.    :  Penis normal Meatal stenosis is absent, penile plaques are absent. Skin lesions are absent.  There is not firmness along the course of the  urethra.  Testes are 10 mL bilaterally without masses.  Rectal examination was not done.   Musculoskeletal:  Motor strength is excellent throughout.     Neurologic:  Grossly nonfocal.    Back: Flanks are nontender.  REVIEW OF IMAGING STUDIES:  Retrograde urethrogram and voiding cystourethrogram were performed earlier and the images were independently interpreted by me and are reviewed with the patient.  These demonstrate a 1 cm  Fossa navicularis (distal penile) urethral stricture that is not obliterative.  The bladder contour is normal. Proximal urethra is normal  REVIEW OF OFFICE STUDIES:     REVIEW OF OUTSIDE RECORDS:   ASSESSMENT/PLAN:  A 68 year old-year-old gentleman with urethral stricture refractory to minimally invasive management.  He has been managed by prior urologist(s) and is referred to our center for advanced treatment options.  Risks, benefits, and alternatives of repeat urethrotomy versus urethroplasty were described.  He wishes to proceed with urethroplasty.  He understands the risks to " include but not be limited to bleeding, infection, penile pain or numbness, scrotal pain or numbness, lower extremity neuropathy, change in erectile function, change in ejaculatory function, and need for additional procedures. He understands the success rate of urethroplasty to be about 95% for anastomotic and 85% for augmentation procedures.    Discussed we can dilate but recurrence is common.  Discussed ventral inlay transurethral buccal urethroplasty, which is a newer, effective approach developed by Dr. Guillen in [place]. He wants to proceed.  Discussed my leave of absence due to family illness.  Will call him when returning to schedule surgery - perhaps sometime in Oct-Dec 2019.  Olvin Cook MD    Again, thank you for allowing me to participate in the care of your patient.      Sincerely,    Olvni Cook MD

## 2019-07-10 NOTE — PATIENT INSTRUCTIONS
We will contact you to schedule surgery at a later time.    It was a pleasure meeting with you today.  Thank you for allowing me and my team the privilege of caring for you today.  YOU are the reason we are here, and I truly hope we provided you with the excellent service you deserve.  Please let us know if there is anything else we can do for you so that we can be sure you are leaving completely satisfied with your care experience.        Juaquin Crawford

## 2019-07-10 NOTE — PROGRESS NOTES
Urology Consult History and Physical    Name: Cuba Paniagua    MRN: [id number]   YOB: 1950         CHIEF COMPLAINT:  Urethral stricture.  HISTORY OF PRESENT ILLNESS:  Mr. Keegan Paniagua is a 68 year old-year-old man seen in consultation from Dr. Baptiste for fossa urethral stricture. Symptoms include slow stream and sense of incomplete emptying.   The penis seems to be curved when urinating.  He feels the urine is blocked. The stream is very thin.  He has not had prior treatments for his urethral stricture.   He does not have a history of self dilation. He does not perform dilation. He does not have a catheter in place.    Etiology:  He denies a history of sexually transmitted diseases. He denies a history of straddle injury. He denies a history of pelvic fracture. He confirms a history of urethral catheterization or instrumentation.  He had a complex lung mass. A biopsy in Dec 2018 was negative. He had old TB. After the biopsy, he suffered from severe hemoptysis and respiratory failure. He ended up coming back to the hospital for about a week. There was no cancer.   During that hospitalization,  He had a catheter for about one week. After that episode, he feels his urine stream changed.   He also has some ED. He's tried Viagra 100mg.    REVIEW OF QUESTIONNAIRES:  Questionnaires reviewed. See flowsheet for details.  PMH:  Vocal cord injury/polyps  Old TB  PSH:   Endobronchial biopsy    Current Outpatient Medications   Medication     doxycycline monohydrate (MONODOX) 100 MG capsule     ranitidine (ZANTAC) 150 MG tablet     sildenafil (VIAGRA) 100 MG tablet     tamsulosin (FLOMAX) 0.4 MG capsule     sildenafil (VIAGRA) 50 MG tablet     simvastatin (ZOCOR) 20 MG tablet     No current facility-administered medications for this visit.      Allergies as of 07/10/2019     (No Known Allergies)     Fam HX  None relevant    Social History     Socioeconomic History     Marital status: Single     Spouse name: Not  on file     Number of children: Not on file     Years of education: Not on file     Highest education level: Not on file   Occupational History     Not on file   Social Needs     Financial resource strain: Not on file     Food insecurity:     Worry: Not on file     Inability: Not on file     Transportation needs:     Medical: Not on file     Non-medical: Not on file   Tobacco Use     Smoking status: Never Smoker     Smokeless tobacco: Never Used   Substance and Sexual Activity     Alcohol use: Never     Frequency: Never     Drug use: Not on file     Sexual activity: Not on file   Lifestyle     Physical activity:     Days per week: Not on file     Minutes per session: Not on file     Stress: Not on file   Relationships     Social connections:     Talks on phone: Not on file     Gets together: Not on file     Attends Uatsdin service: Not on file     Active member of club or organization: Not on file     Attends meetings of clubs or organizations: Not on file     Relationship status: Not on file     Intimate partner violence:     Fear of current or ex partner: Not on file     Emotionally abused: Not on file     Physically abused: Not on file     Forced sexual activity: Not on file   Other Topics Concern     Parent/sibling w/ CABG, MI or angioplasty before 65F 55M? Not Asked   Social History Narrative     Not on file     REVIEW OF SYSTEMS:    Skin: negative  Eyes: negative  Ears/Nose/Throat: negative  Respiratory: No shortness of breath, dyspnea on exertion, cough, or hemoptysis  Cardiovascular: negative  Gastrointestinal: negative  Genitourinary: as above  Musculoskeletal: negative  Neurologic: negative  Psychiatric: negative  Hematologic/Lymphatic/Immunologic: negative  Endocrine: negative  The remainder of the 14-point review of systems is negative.  PHYSICAL EXAMINATION:  General:  Well-dressed, well-nourished man in no acute distress.  Vitals: BP (!) 155/95   Pulse 94   Wt 79.3 kg (174 lb 12.8 oz)   BMI 31.97  "kg/m   Estimated body mass index is 31.97 kg/m  as calculated from the following:    Height as of 6/4/19: 1.575 m (5' 2\").    Weight as of this encounter: 79.3 kg (174 lb 12.8 oz).    Eyes: anicteric, EOMI  Lymph: No cervical, supraclavicular or axillary lymphadenopathy  Lungs:  No respiratory distress.  Heart:  Regular rate and rhythm.     Abdomen:  not obese, soft, nontender, without masses.  There are not surgical scars.    :  Penis normal Meatal stenosis is absent, penile plaques are absent. Skin lesions are absent.  There is not firmness along the course of the  urethra.  Testes are 10 mL bilaterally without masses.  Rectal examination was not done.   Musculoskeletal:  Motor strength is excellent throughout.     Neurologic:  Grossly nonfocal.    Back: Flanks are nontender.  REVIEW OF IMAGING STUDIES:  Retrograde urethrogram and voiding cystourethrogram were performed earlier and the images were independently interpreted by me and are reviewed with the patient.  These demonstrate a 1 cm  Fossa navicularis (distal penile) urethral stricture that is not obliterative.  The bladder contour is normal. Proximal urethra is normal  REVIEW OF OFFICE STUDIES:     REVIEW OF OUTSIDE RECORDS:   ASSESSMENT/PLAN:  A 68 year old-year-old gentleman with urethral stricture refractory to minimally invasive management.  He has been managed by prior urologist(s) and is referred to our center for advanced treatment options.  Risks, benefits, and alternatives of repeat urethrotomy versus urethroplasty were described.  He wishes to proceed with urethroplasty.  He understands the risks to include but not be limited to bleeding, infection, penile pain or numbness, scrotal pain or numbness, lower extremity neuropathy, change in erectile function, change in ejaculatory function, and need for additional procedures. He understands the success rate of urethroplasty to be about 95% for anastomotic and 85% for augmentation procedures.  "   Discussed we can dilate but recurrence is common.  Discussed ventral inlay transurethral buccal urethroplasty, which is a newer, effective approach developed by Dr. Guillen in [place]. He wants to proceed.  Discussed my leave of absence due to family illness.  Will call him when returning to schedule surgery - perhaps sometime in Oct-Dec 2019.  Olvin Cook MD

## 2019-08-09 ENCOUNTER — TELEPHONE (OUTPATIENT)
Dept: UROLOGY | Facility: CLINIC | Age: 69
End: 2019-08-09

## 2019-08-09 NOTE — TELEPHONE ENCOUNTER
Spoke to Cici, I informed her that Dr. Cook is still on JES[facility] and once he returns he will place surgery orders for the patient.  Cici states understanding.    Priya Duffy, RN, BSN  Care Coordinator- Reconstructive Urology

## 2019-08-09 NOTE — TELEPHONE ENCOUNTER
[facility] Call Center    Phone Message    May a detailed message be left on voicemail: yes    Reason for Call: Other: Cici calling in on pt behalf to try to set up a surgery for the pt. Please call Cici back to discuss.      Action Taken: Message routed to:  [facility] ([facility] urology

## 2019-09-20 DIAGNOSIS — N99.114 POSTPROCEDURAL MALE URETHRAL STRICTURE: Primary | ICD-10-CM

## 2019-09-20 RX ORDER — CEFAZOLIN SODIUM 2 G/50ML
2 SOLUTION INTRAVENOUS
Status: CANCELLED | OUTPATIENT
Start: 2019-09-20

## 2019-09-20 RX ORDER — CEFAZOLIN SODIUM 1 G/50ML
1 INJECTION, SOLUTION INTRAVENOUS SEE ADMIN INSTRUCTIONS
Status: CANCELLED | OUTPATIENT
Start: 2019-09-20

## 2019-10-01 ENCOUNTER — PREP FOR PROCEDURE (OUTPATIENT)
Dept: UROLOGY | Facility: CLINIC | Age: 69
End: 2019-10-01

## 2019-10-01 ENCOUNTER — TELEPHONE (OUTPATIENT)
Dept: UROLOGY | Facility: CLINIC | Age: 69
End: 2019-10-01

## 2019-10-01 DIAGNOSIS — N99.114 POSTPROCEDURAL MALE URETHRAL STRICTURE: Primary | ICD-10-CM

## 2019-10-01 NOTE — TELEPHONE ENCOUNTER
Patient is scheduled for surgery with Dr. Cook      Spoke or left message with: Cuba    Date of Surgery: 10/11/19    Location: [facility] OR    Informed patient they will need an adult  yes    Pre-op with surgeon (if applicable): n/a    H&P: Scheduled with PAC 10/4/19    Additional imaging/appointments: n/a    Surgery packet: mailed 10/1/19     Additional comments: n/a

## 2019-10-02 ENCOUNTER — PRE VISIT (OUTPATIENT)
Dept: SURGERY | Facility: CLINIC | Age: 69
End: 2019-10-02

## 2019-10-02 PROBLEM — J38.3 LESION OF VOCAL CORD: Status: ACTIVE | Noted: 2019-06-24

## 2019-10-02 PROBLEM — R04.2 HEMOPTYSIS: Status: ACTIVE | Noted: 2019-01-09

## 2019-10-02 NOTE — TELEPHONE ENCOUNTER
FUTURE VISIT INFORMATION      SURGERY INFORMATION:    Date: 10/11/19    Location[facility]    Surgeon:  Olvin Cook    Anesthesia Type:  Genera;    RECORDS REQUESTED FROM:       Primary Care Provider: Deandra Juarez- requested recs    Most recent EKG+ Tracin19    Most recent PFT's: 19- [facility]

## 2019-10-04 ENCOUNTER — TELEPHONE (OUTPATIENT)
Dept: UROLOGY | Facility: CLINIC | Age: 69
End: 2019-10-04

## 2019-10-04 NOTE — TELEPHONE ENCOUNTER
Surgery on 10/11/19 @ [facility] OR with Dr Cook was cancelled due to no insurance coverage.

## 2021-04-13 ENCOUNTER — APPOINTMENT (OUTPATIENT)
Dept: INTERPRETER SERVICES | Facility: CLINIC | Age: 71
End: 2021-04-13

## 2021-04-28 ENCOUNTER — IMMUNIZATION (OUTPATIENT)
Dept: NURSING | Facility: CLINIC | Age: 71
End: 2021-04-28
Payer: OTHER GOVERNMENT

## 2021-04-28 PROCEDURE — 91300 PR COVID VAC PFIZER DIL RECON 30 MCG/0.3 ML IM: CPT

## 2021-04-28 PROCEDURE — 0001A PR COVID VAC PFIZER DIL RECON 30 MCG/0.3 ML IM: CPT

## 2021-05-19 ENCOUNTER — IMMUNIZATION (OUTPATIENT)
Dept: NURSING | Facility: CLINIC | Age: 71
End: 2021-05-19
Attending: INTERNAL MEDICINE
Payer: OTHER GOVERNMENT

## 2021-05-19 PROCEDURE — 91300 PR COVID VAC PFIZER DIL RECON 30 MCG/0.3 ML IM: CPT

## 2021-05-19 PROCEDURE — 0002A PR COVID VAC PFIZER DIL RECON 30 MCG/0.3 ML IM: CPT

## 2022-01-04 ENCOUNTER — IMMUNIZATION (OUTPATIENT)
Dept: NURSING | Facility: CLINIC | Age: 72
End: 2022-01-04
Payer: OTHER GOVERNMENT

## 2022-01-04 DIAGNOSIS — Z23 NEED FOR COVID-19 VACCINE: Primary | ICD-10-CM

## 2022-01-04 PROCEDURE — 91300 COVID-19,PF,PFIZER (12+ YRS): CPT

## 2022-01-04 PROCEDURE — 0004A COVID-19,PF,PFIZER (12+ YRS): CPT

## (undated) RX ORDER — LIDOCAINE HYDROCHLORIDE 20 MG/ML
JELLY TOPICAL
Status: DISPENSED
Start: 2019-07-10